# Patient Record
Sex: FEMALE | Race: WHITE | NOT HISPANIC OR LATINO | Employment: FULL TIME | URBAN - METROPOLITAN AREA
[De-identification: names, ages, dates, MRNs, and addresses within clinical notes are randomized per-mention and may not be internally consistent; named-entity substitution may affect disease eponyms.]

---

## 2024-05-07 ENCOUNTER — OFFICE VISIT (OUTPATIENT)
Dept: OBSTETRICS AND GYNECOLOGY | Facility: CLINIC | Age: 54
End: 2024-05-07
Payer: COMMERCIAL

## 2024-05-07 VITALS
DIASTOLIC BLOOD PRESSURE: 72 MMHG | SYSTOLIC BLOOD PRESSURE: 122 MMHG | WEIGHT: 138.25 LBS | BODY MASS INDEX: 24.5 KG/M2 | HEIGHT: 63 IN

## 2024-05-07 DIAGNOSIS — N89.8 VAGINAL DISCHARGE: ICD-10-CM

## 2024-05-07 DIAGNOSIS — Z01.419 WELL WOMAN EXAM WITH ROUTINE GYNECOLOGICAL EXAM: ICD-10-CM

## 2024-05-07 DIAGNOSIS — Z12.31 VISIT FOR SCREENING MAMMOGRAM: ICD-10-CM

## 2024-05-07 DIAGNOSIS — E89.41 SURGICAL MENOPAUSE, SYMPTOMATIC: Primary | ICD-10-CM

## 2024-05-07 PROCEDURE — 36415 COLL VENOUS BLD VENIPUNCTURE: CPT

## 2024-05-07 PROCEDURE — 87205 SMEAR GRAM STAIN: CPT

## 2024-05-07 PROCEDURE — 1036F TOBACCO NON-USER: CPT | Performed by: OBSTETRICS & GYNECOLOGY

## 2024-05-07 PROCEDURE — 99396 PREV VISIT EST AGE 40-64: CPT | Performed by: OBSTETRICS & GYNECOLOGY

## 2024-05-07 PROCEDURE — 83001 ASSAY OF GONADOTROPIN (FSH): CPT

## 2024-05-07 RX ORDER — ESTRADIOL 10 UG/1
INSERT VAGINAL
Qty: 18 TABLET | Refills: 3 | Status: SHIPPED | OUTPATIENT
Start: 2024-05-09

## 2024-05-07 RX ORDER — SERTRALINE HYDROCHLORIDE 50 MG/1
50 TABLET, FILM COATED ORAL
COMMUNITY
Start: 2024-01-09

## 2024-05-07 RX ORDER — LANOLIN ALCOHOL/MO/W.PET/CERES
1000 CREAM (GRAM) TOPICAL
COMMUNITY

## 2024-05-07 RX ORDER — IBUPROFEN 600 MG/1
TABLET ORAL EVERY 6 HOURS
COMMUNITY
Start: 2022-04-11

## 2024-05-07 RX ORDER — OCRELIZUMAB 300 MG/10ML
INJECTION INTRAVENOUS
COMMUNITY
Start: 2022-01-18

## 2024-05-07 NOTE — PROGRESS NOTES
"Patient presents for an annual exam today   Last PAP 2020 NEG HPV-  Last Mammogram 2022  Vaginal discharge for the past month     Christie Rivero, RMA     ANNUAL SUBJECTIVE    Soledad Harris is a 54 y.o. female who presents for annual exam today.  Her periods are absent 2/2 to h/o TLH.  C/o vaginal discharge for past 4-6 wks.  Also c/o painful intercourse and vaginal dryness.      PMH - depression/anxiety    PSH -  section x3, TLH/BS    OB history -   No obstetric history on file.    Last pap -   Normal HPV Negative     Last mammogram - 2022    Family history of breast or ovarian cancer - no    OBJECTIVE  /72 (BP Location: Left arm, Patient Position: Sitting, BP Cuff Size: Adult)   Ht 1.6 m (5' 3\")   Wt 62.7 kg (138 lb 4 oz)   LMP  (LMP Unknown)   Breastfeeding No   BMI 24.49 kg/m²     General Appearance   - consistent with stated age, well groomed and cooperative    Integumentary  - skin warm and dry without rash    Head and Neck  - normalocephalic and neck supple    Chest and Lung Exam  - normal breathing effort, no respiratory distress    Breast  - symmetry noted, no mass palpable, no skin change and no nipple discharge.    Abdomen  - soft, nontender and no hepatomegaly, splenomegaly, or mass    Female Genitourinary  - vulva normal without rash or lesion, unable to place speculum 2/2 to patient discomfort, affirm sent, able to perform bimanual exam, no adnexal mass felt, uterus surgically absent    Peripheral Vascular  - no edema present    ASSESSMENT/PLAN  54 y.o. yo  female who presents for annual exam.       Actions performed during this visit include:  - Clinical breast exam normal  - Clinical pelvic exam sig for significant atrophy, will start vaginal estrogen to help with this  - Pap: not indicated  - Mammogram ordered, patient due now  - Labs affirm sent for vaginal discharge, FSH sent given h/o TLH, patient is menopausal by symptoms/age    Please return for your " next visit in 1 year.    Fidelia Augustin MD

## 2024-05-08 LAB
BACTERIAL VAGINOSIS VAG-IMP: NORMAL
CLUE CELLS VAG LPF-#/AREA: NORMAL /[LPF]
FSH SERPL-ACNC: 124.6 IU/L
NUGENT SCORE: 4
YEAST VAG WET PREP-#/AREA: NORMAL

## 2024-05-09 DIAGNOSIS — N76.0 BACTERIAL VAGINOSIS: Primary | ICD-10-CM

## 2024-05-09 DIAGNOSIS — B96.89 BACTERIAL VAGINOSIS: Primary | ICD-10-CM

## 2024-05-09 RX ORDER — METRONIDAZOLE 500 MG/1
500 TABLET ORAL 2 TIMES DAILY
Qty: 14 TABLET | Refills: 0 | Status: SHIPPED | OUTPATIENT
Start: 2024-05-09 | End: 2024-05-16

## 2024-05-10 ENCOUNTER — TELEPHONE (OUTPATIENT)
Dept: OBSTETRICS AND GYNECOLOGY | Facility: CLINIC | Age: 54
End: 2024-05-10
Payer: COMMERCIAL

## 2024-05-10 NOTE — TELEPHONE ENCOUNTER
----- Message from Fidelia Augustin MD sent at 5/9/2024  6:36 AM EDT -----  Her FSH is 124, she is definitely in menopause    Her affirm was indeterminate for BV, would treat b/c she is symptomatic.  Will send flagyl PO to her pharmacy now, will you call her and let her know? Thanks so much    I called and spoke with the patient. Patient aware of all results and that an Rx was sent in for her. Patient had no questions or concerns.     FRANK Estrada  ----- Message -----  From: Lab, Background User  Sent: 5/8/2024   1:10 AM EDT  To: Fidelia Augustin MD

## 2024-05-11 ENCOUNTER — HOSPITAL ENCOUNTER (OUTPATIENT)
Dept: RADIOLOGY | Facility: CLINIC | Age: 54
Discharge: HOME | End: 2024-05-11
Payer: COMMERCIAL

## 2024-05-11 VITALS — HEIGHT: 63 IN | WEIGHT: 138.23 LBS | BODY MASS INDEX: 24.49 KG/M2

## 2024-05-11 DIAGNOSIS — Z12.31 VISIT FOR SCREENING MAMMOGRAM: ICD-10-CM

## 2024-05-11 PROCEDURE — 77067 SCR MAMMO BI INCL CAD: CPT | Performed by: RADIOLOGY

## 2024-05-11 PROCEDURE — 77063 BREAST TOMOSYNTHESIS BI: CPT | Performed by: RADIOLOGY

## 2024-05-11 PROCEDURE — 77067 SCR MAMMO BI INCL CAD: CPT

## 2024-05-15 ENCOUNTER — TELEPHONE (OUTPATIENT)
Dept: OBSTETRICS AND GYNECOLOGY | Facility: CLINIC | Age: 54
End: 2024-05-15
Payer: COMMERCIAL

## 2024-05-15 DIAGNOSIS — R92.8 ABNORMALITY OF RIGHT BREAST ON SCREENING MAMMOGRAM: ICD-10-CM

## 2024-05-15 NOTE — TELEPHONE ENCOUNTER
----- Message from Fidelia Augustin MD sent at 5/15/2024 12:43 PM EDT -----  Needs additional mammogram views of right breast and ultrasound.  Can you place orders and let her know? Thanks so much.  I called and spoke with the patient. Patient aware of results and additional testing needed. Patient had no questions or concerns.     Christie Rivero AMY  ----- Message -----  From: Interface, Radiology Results In  Sent: 5/15/2024  11:41 AM EDT  To: Fidelia Augustin MD

## 2025-01-26 DIAGNOSIS — E89.41 SURGICAL MENOPAUSE, SYMPTOMATIC: ICD-10-CM

## 2025-01-27 RX ORDER — ESTRADIOL 10 UG/1
INSERT VAGINAL
Qty: 18 TABLET | Refills: 3 | Status: SHIPPED | OUTPATIENT
Start: 2025-01-27

## 2025-01-27 NOTE — TELEPHONE ENCOUNTER
Requested Prescriptions     Pending Prescriptions Disp Refills    estradiol (Yuvafem) 10 mcg tablet vaginal tablet [Pharmacy Med Name: YUVAFEM 10 MCG VAGINAL INSERT] 18 tablet 3     Sig: INSERT 1 TABLET INTO VAGINA TWICE WEEKLY AT BEDTIME DO NOT FILL BEFORE MAY 9, 2024.     LOV 5/7/2024  NOV N/A  Last pap Total Hysterectomy 2022    CATHY SLATER MA

## 2025-04-10 ENCOUNTER — HOSPITAL ENCOUNTER (OUTPATIENT)
Dept: RADIOLOGY | Facility: HOSPITAL | Age: 55
Discharge: HOME | End: 2025-04-10
Payer: COMMERCIAL

## 2025-04-10 VITALS — HEIGHT: 63 IN | WEIGHT: 138 LBS | BODY MASS INDEX: 24.45 KG/M2

## 2025-04-10 DIAGNOSIS — R92.8 ABNORMALITY OF RIGHT BREAST ON SCREENING MAMMOGRAM: ICD-10-CM

## 2025-04-10 PROCEDURE — 77066 DX MAMMO INCL CAD BI: CPT
